# Patient Record
Sex: MALE | Race: OTHER | HISPANIC OR LATINO | URBAN - METROPOLITAN AREA
[De-identification: names, ages, dates, MRNs, and addresses within clinical notes are randomized per-mention and may not be internally consistent; named-entity substitution may affect disease eponyms.]

---

## 2024-08-22 PROBLEM — Z00.00 ENCOUNTER FOR PREVENTIVE HEALTH EXAMINATION: Status: ACTIVE | Noted: 2024-08-22

## 2024-09-09 ENCOUNTER — OUTPATIENT (OUTPATIENT)
Dept: OUTPATIENT SERVICES | Facility: HOSPITAL | Age: 39
LOS: 1 days | End: 2024-09-09
Payer: SELF-PAY

## 2024-09-09 ENCOUNTER — APPOINTMENT (OUTPATIENT)
Dept: FAMILY MEDICINE | Facility: HOSPITAL | Age: 39
End: 2024-09-09

## 2024-09-09 VITALS
TEMPERATURE: 98.1 F | SYSTOLIC BLOOD PRESSURE: 109 MMHG | RESPIRATION RATE: 15 BRPM | BODY MASS INDEX: 31.65 KG/M2 | DIASTOLIC BLOOD PRESSURE: 66 MMHG | HEIGHT: 65 IN | OXYGEN SATURATION: 99 % | WEIGHT: 190 LBS | HEART RATE: 81 BPM

## 2024-09-09 DIAGNOSIS — Z00.00 ENCOUNTER FOR GENERAL ADULT MEDICAL EXAMINATION WITHOUT ABNORMAL FINDINGS: ICD-10-CM

## 2024-09-09 DIAGNOSIS — H11.039: ICD-10-CM

## 2024-09-09 DIAGNOSIS — Z29.9 ENCOUNTER FOR PROPHYLACTIC MEASURES, UNSPECIFIED: ICD-10-CM

## 2024-09-09 DIAGNOSIS — Z23 ENCOUNTER FOR IMMUNIZATION: ICD-10-CM

## 2024-09-09 PROCEDURE — 85027 COMPLETE CBC AUTOMATED: CPT

## 2024-09-09 PROCEDURE — 84443 ASSAY THYROID STIM HORMONE: CPT

## 2024-09-09 PROCEDURE — 80061 LIPID PANEL: CPT

## 2024-09-09 PROCEDURE — G0463: CPT

## 2024-09-09 PROCEDURE — 80053 COMPREHEN METABOLIC PANEL: CPT

## 2024-09-10 LAB
HCT VFR BLD CALC: 43 %
HGB BLD-MCNC: 14 G/DL
MCHC RBC-ENTMCNC: 28.9 PG
MCHC RBC-ENTMCNC: 32.6 GM/DL
MCV RBC AUTO: 88.8 FL
PLATELET # BLD AUTO: 282 K/UL
RBC # BLD: 4.84 M/UL
RBC # FLD: 13.5 %
WBC # FLD AUTO: 6.27 K/UL

## 2024-09-11 NOTE — ASSESSMENT
[FreeTextEntry1] : 38 yo male, previously healthy, presenting for eye redness of 3 years duration. 
[FreeTextEntry1] : 38 yo male, previously healthy, presenting for eye redness of 3 years duration. 
No change

## 2024-09-11 NOTE — INTERPRETER SERVICES
[Time Spent: ____ minutes] : Total time spent using  services: [unfilled] minutes. The patient's primary language is not English thus required  services. [Interpreters_IDNumber] : [id number]

## 2024-09-11 NOTE — HISTORY OF PRESENT ILLNESS
[FreeTextEntry8] : 40 yo male, previously healthy, presenting for eye redness of 3 years duration, comes and goes, no injury, no trauma, no pain, positive for blurry vision + sees a shadow, no eyelid swelling. Episodes happen 1-2 times a week, redness goes away on its own, sometimes uses eyedrops, doesn't remember specific names.

## 2024-09-11 NOTE — REVIEW OF SYSTEMS
[Redness] : redness [Vision Problems] : vision problems [Negative] : Musculoskeletal [Discharge] : no discharge [Pain] : no pain [Itching] : no itching [Dysuria] : no dysuria [Frequency] : no frequency

## 2024-09-11 NOTE — PLAN
[FreeTextEntry1] : # Eye redness, Pterygium  - Ophthalmology referral for evaluation  # [facility] - CBC, CMP, TSH, Lipid Profile - F/u in clinic    Discussed with Dr Villareal

## 2024-09-11 NOTE — PHYSICAL EXAM
[EOMI] : extraocular movements intact [Normal] : the outer ears and nose were normal in appearance and the oropharynx was normal [de-identified] : Left conjuctiva: + redness, + whitish membrane over conjuctiva b/l (more pronounced in left eye)

## 2024-09-11 NOTE — INTERPRETER SERVICES
[Time Spent: ____ minutes] : Total time spent using  services: [unfilled] minutes. The patient's primary language is not English thus required  services. [Interpreters_IDNumber] : [id number]

## 2024-09-11 NOTE — PHYSICAL EXAM
[EOMI] : extraocular movements intact [Normal] : the outer ears and nose were normal in appearance and the oropharynx was normal [de-identified] : Left conjuctiva: + redness, + whitish membrane over conjuctiva b/l (more pronounced in left eye)

## 2024-09-17 LAB
ALBUMIN SERPL ELPH-MCNC: 4.8 G/DL
ALP BLD-CCNC: 111 U/L
ALT SERPL-CCNC: 36 U/L
ANION GAP SERPL CALC-SCNC: 15 MMOL/L
AST SERPL-CCNC: 33 U/L
BILIRUB SERPL-MCNC: 0.3 MG/DL
BUN SERPL-MCNC: 13 MG/DL
CALCIUM SERPL-MCNC: 9.2 MG/DL
CHLORIDE SERPL-SCNC: 106 MMOL/L
CHOLEST SERPL-MCNC: 183 MG/DL
CO2 SERPL-SCNC: 23 MMOL/L
CREAT SERPL-MCNC: 0.99 MG/DL
EGFR: 101 ML/MIN/1.73M2
GLUCOSE SERPL-MCNC: 88 MG/DL
HDLC SERPL-MCNC: 47 MG/DL
LDLC SERPL CALC-MCNC: 120 MG/DL
NONHDLC SERPL-MCNC: 136 MG/DL
POTASSIUM SERPL-SCNC: 3.7 MMOL/L
PROT SERPL-MCNC: 7.5 G/DL
SODIUM SERPL-SCNC: 143 MMOL/L
TRIGL SERPL-MCNC: 92 MG/DL
TSH SERPL-ACNC: 1.82 UIU/ML

## 2024-11-04 ENCOUNTER — APPOINTMENT (OUTPATIENT)
Dept: FAMILY MEDICINE | Facility: HOSPITAL | Age: 39
End: 2024-11-04